# Patient Record
Sex: FEMALE | Race: WHITE
[De-identification: names, ages, dates, MRNs, and addresses within clinical notes are randomized per-mention and may not be internally consistent; named-entity substitution may affect disease eponyms.]

---

## 2018-11-12 ENCOUNTER — HOSPITAL ENCOUNTER (EMERGENCY)
Dept: HOSPITAL 63 - ER | Age: 23
Discharge: HOME | End: 2018-11-12
Payer: OTHER GOVERNMENT

## 2018-11-12 VITALS — DIASTOLIC BLOOD PRESSURE: 68 MMHG | SYSTOLIC BLOOD PRESSURE: 116 MMHG

## 2018-11-12 DIAGNOSIS — R11.11: ICD-10-CM

## 2018-11-12 DIAGNOSIS — R05: ICD-10-CM

## 2018-11-12 DIAGNOSIS — R10.11: Primary | ICD-10-CM

## 2018-11-12 LAB
ALBUMIN SERPL-MCNC: 3.3 G/DL (ref 3.4–5)
ALBUMIN/GLOB SERPL: 0.8 {RATIO} (ref 1–1.7)
ALP SERPL-CCNC: 54 U/L (ref 46–116)
ALT SERPL-CCNC: 16 U/L (ref 14–59)
ANION GAP SERPL CALC-SCNC: 8 MMOL/L (ref 6–14)
AST SERPL-CCNC: 12 U/L (ref 15–37)
BASOPHILS # BLD AUTO: 0.1 X10^3/UL (ref 0–0.2)
BASOPHILS NFR BLD: 1 % (ref 0–3)
BILIRUB SERPL-MCNC: 0.9 MG/DL (ref 0.2–1)
BUN/CREAT SERPL: 16 (ref 6–20)
CA-I SERPL ISE-MCNC: 11 MG/DL (ref 7–20)
CALCIUM SERPL-MCNC: 8.6 MG/DL (ref 8.5–10.1)
CHLORIDE SERPL-SCNC: 102 MMOL/L (ref 98–107)
CO2 SERPL-SCNC: 28 MMOL/L (ref 21–32)
CREAT SERPL-MCNC: 0.7 MG/DL (ref 0.6–1)
EOSINOPHIL NFR BLD: 0.2 X10^3/UL (ref 0–0.7)
EOSINOPHIL NFR BLD: 2 % (ref 0–3)
ERYTHROCYTE [DISTWIDTH] IN BLOOD BY AUTOMATED COUNT: 12.5 % (ref 11.5–14.5)
GFR SERPLBLD BASED ON 1.73 SQ M-ARVRAT: 102 ML/MIN
GLOBULIN SER-MCNC: 3.9 G/DL (ref 2.2–3.8)
GLUCOSE SERPL-MCNC: 106 MG/DL (ref 70–99)
HCT VFR BLD CALC: 37.2 % (ref 36–47)
HGB BLD-MCNC: 12.5 G/DL (ref 12–15.5)
LYMPHOCYTES # BLD: 2.3 X10^3/UL (ref 1–4.8)
LYMPHOCYTES NFR BLD AUTO: 28 % (ref 24–48)
MCH RBC QN AUTO: 29 PG (ref 25–35)
MCHC RBC AUTO-ENTMCNC: 34 G/DL (ref 31–37)
MCV RBC AUTO: 87 FL (ref 79–100)
MONO #: 0.5 X10^3/UL (ref 0–1.1)
MONOCYTES NFR BLD: 7 % (ref 0–9)
NEUT #: 5.1 X10^3UL (ref 1.8–7.7)
NEUTROPHILS NFR BLD AUTO: 62 % (ref 31–73)
PLATELET # BLD AUTO: 354 X10^3/UL (ref 140–400)
POTASSIUM SERPL-SCNC: 3.6 MMOL/L (ref 3.5–5.1)
PROT SERPL-MCNC: 7.2 G/DL (ref 6.4–8.2)
RBC # BLD AUTO: 4.28 X10^6/UL (ref 3.5–5.4)
SODIUM SERPL-SCNC: 138 MMOL/L (ref 136–145)
WBC # BLD AUTO: 8.1 X10^3/UL (ref 4–11)

## 2018-11-12 PROCEDURE — 85025 COMPLETE CBC W/AUTO DIFF WBC: CPT

## 2018-11-12 PROCEDURE — 99284 EMERGENCY DEPT VISIT MOD MDM: CPT

## 2018-11-12 PROCEDURE — 80053 COMPREHEN METABOLIC PANEL: CPT

## 2018-11-12 PROCEDURE — 36415 COLL VENOUS BLD VENIPUNCTURE: CPT

## 2018-11-12 NOTE — PHYS DOC
Adult General


Chief Complaint


Chief Complaint:  ABDOMINAL PAIN





[facility]


HPI


23-year-old female presents with right upper quadrant abdominal pain. The 

patient was diagnosed a few months ago with gallstones. She was unable to 

schedule surgery at that time. She was feeling well until last month when she 

began to have pain after eating almost any food.  She presents today because 

the pain started last night and has not let up. She had one episode of vomiting 

this morning. She is concerned she might have a stone obstructing the common 

bile duct. She denies fever or chills.





Review of Systems


Review of Systems





Constitutional: Denies fever or chills []


Eyes: Denies change in visual acuity, redness, or eye pain []


HENT: Denies nasal congestion or sore throat []


Respiratory: Denies cough or shortness of breath []


Cardiovascular: No additional information not addressed in HPI []


GI: Right upper quadrant abdominal pain, vomiting[]


: Denies dysuria or hematuria []


Musculoskeletal: Denies back pain or joint pain []


Integument: Denies rash or skin lesions []


Neurologic: Denies headache, focal weakness or sensory changes []


Endocrine: Denies polyuria or polydipsia []





All other systems were reviewed and found to be within normal limits, except as 

documented in this note.





Allergies


Allergies





Allergies








Coded Allergies Type Severity Reaction Last Updated Verified


 


  No Known Drug Allergies    11/12/18 No











Physical Exam


Physical Exam





Constitutional: Well developed, well nourished, no acute distress, non-toxic 

appearance. []


HENT: Normocephalic, atraumatic, bilateral external ears normal, oropharynx 

moist, no oral exudates, nose normal. []


Eyes: PERRLA, EOMI, conjunctiva normal, no discharge. [] 


Neck: Normal range of motion, no tenderness, supple, no stridor. [] 


Cardiovascular:Heart rate regular rhythm, no murmur []


Lungs & Thorax:  Bilateral breath sounds clear to auscultation []


Abdomen: Bowel sounds normal, soft. Epigastric and right upper quadrant 

tenderness with guarding.[] 


Skin: Warm, dry, no erythema, no rash. [] 


Back: No tenderness, no CVA tenderness. [] 


Extremities: No tenderness, no cyanosis, no clubbing, ROM intact, no edema. [] 


Neurologic: Alert and oriented X 3, normal motor function, normal sensory 

function, no focal deficits noted. []


Psychologic: Affect normal, judgement normal, mood normal. []





EKG


EKG


[]





Radiology/Procedures


Radiology/Procedures


[]





Course & Med Decision Making


Course & Med Decision Making


Pertinent Labs and Imaging studies reviewed. (See chart for details)


The patient's labs are unremarkable. This pain is likely related to her 

gallbladder disease. She does not appear to have an obstruction or infection at 

this time. I will discharge her with Toradol oral medication to see if this 

helps better than over-the-counter medicines. The patient has also had a cough 

for a couple of weeks. I do not have evidence of strep or bacterial infection. 

This is likely viral induced cough. I will give her a trial of Tessalon Perles. 

She is stable for discharge at this time.


[]





Dragon Disclaimer


Dragon Disclaimer


This electronic medical record was generated, in whole or in part, using a 

voice recognition dictation system.





Departure


Departure:


Referrals:  


DAYANA HAILE DO, MPH (PCP)


Scripts


Benzonatate (TESSALON PERLE) 100 Mg Capsule


1 CAP PO TID PRN for COUGH, #30 CAP


   Prov: JANELL ORELLANA DO         11/12/18 


Ketorolac Tromethamine (KETOROLAC TROMETHAMINE) 10 Mg Tablet


1 TAB PO PRN Q6HRS PRN for PAIN, #20 TAB


   Prov: JANELL ORELLANA DO         11/12/18











JANELL ORELLANA DO Nov 12, 2018 13:43

## 2018-12-03 ENCOUNTER — HOSPITAL ENCOUNTER (EMERGENCY)
Dept: HOSPITAL 63 - ER | Age: 23
Discharge: HOME | End: 2018-12-03
Payer: COMMERCIAL

## 2018-12-03 VITALS — HEIGHT: 63 IN | BODY MASS INDEX: 23.92 KG/M2 | WEIGHT: 135 LBS

## 2018-12-03 VITALS — DIASTOLIC BLOOD PRESSURE: 65 MMHG | SYSTOLIC BLOOD PRESSURE: 115 MMHG

## 2018-12-03 DIAGNOSIS — S06.0X1A: Primary | ICD-10-CM
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
## 2018-12-03 DIAGNOSIS — R11.2: ICD-10-CM
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
## 2018-12-03 DIAGNOSIS — J32.8: ICD-10-CM
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
## 2018-12-03 PROCEDURE — 70450 CT HEAD/BRAIN W/O DYE: CPT

## 2018-12-03 PROCEDURE — 99284 EMERGENCY DEPT VISIT MOD MDM: CPT

## 2018-12-03 NOTE — RAD
CT of the head without contrast, 12/3/2018:

 

HISTORY: Head trauma, visual disturbance

 

The ventricles are within normal limits in size. There is no shift of the 

midline structures. There is no evidence of acute intracranial hemorrhage 

or mass effect.

 

There is mucosal thickening in the maxillary, ethmoid and frontal sinuses 

bilaterally. An air-fluid level is present in the left maxillary sinus.

 

IMPRESSION:

1. No acute intracranial abnormality is detected.

2. Bilateral paranasal sinusitis

 

 

PQRS Compliance Statement:

 

One or more of the following individualized dose reduction techniques were

utilized for this examination:

1. Automated exposure control

2. Adjustment of the mA and/or kV according to patient size

3. Use of iterative reconstruction technique

 

Electronically signed by: Rick Moritz, MD (12/3/2018 3:01 PM) [facility]

## 2018-12-03 NOTE — PHYS DOC
Past History


Past Medical History:  No Pertinent History


Past Surgical History:  No Surgical History


Alcohol Use:  None


Drug Use:  None





Adult General


Chief Complaint


Chief Complaint:  head injury





HPI


HPI





Patient is a 23 year old female who presents with complaining of head injury. 

Patient states she was at work 3 days ago and hit the back of her head wall 

metal against the loss of consciousness. Patient states she had headache that 

improved yesterday but today she had 1 episode of vomiting and complaining of 

confusion and blurred vision with headache she was at work without focal neuro 

deficit, fever and chills, chest pain and shortness of breath. Patient was sent 

to her primary care physician as a work comp injury and sent to ER for CT of 

her head.





Review of Systems


Review of Systems





Constitutional: Denies fever or chills []


Eyes: Denies change in visual acuity, redness, or eye pain []


HENT: Denies nasal congestion or sore throat []


Respiratory: Denies cough or shortness of breath []


Cardiovascular: No additional information not addressed in HPI []


GI: Denies abdominal pain, bloody stools or diarrhea, reports nausea and 

vomiting []


: Denies dysuria or hematuria []


Musculoskeletal: Denies back pain or joint pain []


Integument: Denies rash or skin lesions []


Neurologic: Reports headache, denies focal weakness or sensory changes []


Endocrine: Denies polyuria or polydipsia []





All other systems were reviewed and found to be within normal limits, except as 

documented in this note.





Current Medications


Current Medications





Current Medications








 Medications


  (Trade)  Dose


 Ordered  Sig/Ran  Start Time


 Stop Time Status Last Admin


Dose Admin


 


 Ibuprofen


  (Motrin)  600 mg  1X  ONCE  12/3/18 15:15


 12/3/18 15:16 UNV  





 


 Ondansetron HCl


  (Zofran Odt)  4 mg  1X  ONCE  12/3/18 14:30


 12/3/18 14:31 DC 12/3/18 14:19


4 MG











Allergies


Allergies





Allergies








Coded Allergies Type Severity Reaction Last Updated Verified


 


  No Known Drug Allergies    18 No











Physical Exam


Physical Exam





Constitutional: Well developed, well nourished, mild distress, non-toxic 

appearance. []


HENT: Normocephalic, atraumatic, bilateral external ears normal, oropharynx 

moist, no oral exudates, nose normal. []


Eyes: PERRLA, EOMI, conjunctiva normal, no discharge. [] 


Neck: Normal range of motion, no tenderness, supple, no stridor. [] 


Cardiovascular:Heart rate regular rhythm, no murmur []


Lungs & Thorax:  Bilateral breath sounds clear to auscultation []


Abdomen: Bowel sounds normal, soft, no tenderness, no masses, no pulsatile 

masses. [] 


Skin: Warm, dry, no erythema, no rash. [] 


Back: No tenderness, no CVA tenderness. [] 


Extremities: No tenderness, no cyanosis, no clubbing, ROM intact, no edema. [] 


Neurologic: Alert and oriented X 3, normal motor function, normal sensory 

function, no focal deficits noted. []


Psychologic: Affect normal, judgement normal, mood normal. []





Current Patient Data


Vital Signs





 Vital Signs








  Date Time  Temp Pulse Resp B/P (MAP) Pulse Ox O2 Delivery O2 Flow Rate FiO2


 


12/3/18 13:43 98.7 73 18  99 Room Air  











EKG


EKG


[]





Radiology/Procedures


Radiology/Procedures


[place] [address]


 [phone]


 


 IMAGING REPORT





 Signed





PATIENT: CHRISTIE FLANAGAN ACCOUNT: [id number] MRN#: [id number]


: 1995 LOCATION: ER AGE: 23


SEX: F EXAM DT: 18 ACCESSION#: [id number]


STATUS: REG ER ORD. PHYSICIAN: NABILA DUNLAP MD 


REASON: injury


PROCEDURE: CT HEAD WO CONTRAST





CT of the head without contrast, 12/3/2018:


 


HISTORY: Head trauma, visual disturbance


 


The ventricles are within normal limits in size. There is no shift of the 


midline structures. There is no evidence of acute intracranial hemorrhage 


or mass effect.


 


There is mucosal thickening in the maxillary, ethmoid and frontal sinuses 


bilaterally. An air-fluid level is present in the left maxillary sinus.


 


IMPRESSION:


1. No acute intracranial abnormality is detected.


2. Bilateral paranasal sinusitis


 


 


PQRS Compliance Statement:


 


One or more of the following individualized dose reduction techniques were


utilized for this examination:


1. Automated exposure control


2. Adjustment of the mA and/or kV according to patient size


3. Use of iterative reconstruction technique


 


Electronically signed by: Rick Moritz, MD (12/3/2018 3:01 PM) [facility]














DICTATED AND SIGNED BY:     MORITZ,RICK S MD


DATE:     18 1459





CC: NABILA DUNLAP MD; DAYANA HAILE DO, MPH ~





Course & Med Decision Making


Course & Med Decision Making


Pertinent Imaging studies reviewed. (See chart for details)





Evolution of patient in ER showed 23-year-old female patient with head injury 2 

days ago and complaining of confusion and nausea and vomiting. Patient had 

unremarkable CT head except for paranasal sinusitis and physical exam without 

sinus tenderness and felt better with Zofran and ibuprofen. Plan discharge 

patient home to diagnose of concussion.





Dragon Disclaimer


Dragon Disclaimer


This electronic medical record was generated, in whole or in part, using a 

voice recognition dictation system.





Departure


Departure:


Impression:  


 Primary Impression:  


 Concussion


 Additional Impressions:  


 Nausea and vomiting


 Sinusitis


Disposition:   HOME, SELF-CARE (at 1522)


Condition:  IMPROVED


Referrals:  


DAYANA HAILE DO, MPH (PCP)


Patient Instructions:  Concussion and Brain Injury, Nausea and Vomiting





Additional Instructions:  


Drink plenty of liquids


Follow-up with your primary care physician in 3-5 days


Return to ER if not getting better


Scripts


Ondansetron Hcl (ZOFRAN) 4 Mg Tablet


1 TAB PO Q6HRS for nausea and vomiting, #12 TAB


   Prov: NABILA DUNLAP MD         12/3/18 


Ibuprofen (IBUPROFEN) 600 Mg Tablet


600 MG PO TID for pain, #20 TAB


   Prov: NABILA DUNLAP MD         12/3/18





Problem Qualifiers











NABILA DUNLAP MD Dec 3, 2018 15:24

## 2019-07-08 ENCOUNTER — HOSPITAL ENCOUNTER (EMERGENCY)
Dept: HOSPITAL 63 - ER | Age: 24
Discharge: HOME | End: 2019-07-08
Payer: COMMERCIAL

## 2019-07-08 VITALS — DIASTOLIC BLOOD PRESSURE: 75 MMHG | SYSTOLIC BLOOD PRESSURE: 123 MMHG

## 2019-07-08 DIAGNOSIS — R10.12: Primary | ICD-10-CM

## 2019-07-08 DIAGNOSIS — G43.909: ICD-10-CM

## 2019-07-08 DIAGNOSIS — K92.1: ICD-10-CM

## 2019-07-08 DIAGNOSIS — R10.32: ICD-10-CM

## 2019-07-08 LAB
ALBUMIN SERPL-MCNC: 3.2 G/DL (ref 3.4–5)
ALBUMIN/GLOB SERPL: 1 {RATIO} (ref 1–1.7)
ALP SERPL-CCNC: 45 U/L (ref 46–116)
ALT SERPL-CCNC: 14 U/L (ref 14–59)
ANION GAP SERPL CALC-SCNC: 8 MMOL/L (ref 6–14)
APTT PPP: YELLOW S
AST SERPL-CCNC: < 5 U/L (ref 15–37)
BACTERIA #/AREA URNS HPF: (no result) /HPF
BASOPHILS # BLD AUTO: 0.1 X10^3/UL (ref 0–0.2)
BASOPHILS NFR BLD: 1 % (ref 0–3)
BILIRUB SERPL-MCNC: 0.3 MG/DL (ref 0.2–1)
BILIRUB UR QL STRIP: (no result)
BUN/CREAT SERPL: 13 (ref 6–20)
CA-I SERPL ISE-MCNC: 9 MG/DL (ref 7–20)
CALCIUM SERPL-MCNC: 8.4 MG/DL (ref 8.5–10.1)
CHLORIDE SERPL-SCNC: 105 MMOL/L (ref 98–107)
CO2 SERPL-SCNC: 28 MMOL/L (ref 21–32)
CREAT SERPL-MCNC: 0.7 MG/DL (ref 0.6–1)
EOSINOPHIL NFR BLD: 0.3 X10^3/UL (ref 0–0.7)
EOSINOPHIL NFR BLD: 4 % (ref 0–3)
ERYTHROCYTE [DISTWIDTH] IN BLOOD BY AUTOMATED COUNT: 12.7 % (ref 11.5–14.5)
FECAL OB PT: NEGATIVE
FIBRINOGEN PPP-MCNC: CLEAR MG/DL
GFR SERPLBLD BASED ON 1.73 SQ M-ARVRAT: 101.1 ML/MIN
GLOBULIN SER-MCNC: 3.1 G/DL (ref 2.2–3.8)
GLUCOSE SERPL-MCNC: 87 MG/DL (ref 70–99)
GLUCOSE UR STRIP-MCNC: (no result) MG/DL
HCT VFR BLD CALC: 39.7 % (ref 36–47)
HGB BLD-MCNC: 13.2 G/DL (ref 12–15.5)
LIPASE: 88 U/L (ref 73–393)
LYMPHOCYTES # BLD: 2.9 X10^3/UL (ref 1–4.8)
LYMPHOCYTES NFR BLD AUTO: 39 % (ref 24–48)
MCH RBC QN AUTO: 30 PG (ref 25–35)
MCHC RBC AUTO-ENTMCNC: 33 G/DL (ref 31–37)
MCV RBC AUTO: 90 FL (ref 79–100)
MONO #: 0.5 X10^3/UL (ref 0–1.1)
MONOCYTES NFR BLD: 6 % (ref 0–9)
NEUT #: 3.7 X10^3UL (ref 1.8–7.7)
NEUTROPHILS NFR BLD AUTO: 50 % (ref 31–73)
NITRITE UR QL STRIP: (no result)
PLATELET # BLD AUTO: 305 X10^3/UL (ref 140–400)
POTASSIUM SERPL-SCNC: 3.9 MMOL/L (ref 3.5–5.1)
PROT SERPL-MCNC: 6.3 G/DL (ref 6.4–8.2)
RBC # BLD AUTO: 4.4 X10^6/UL (ref 3.5–5.4)
RBC #/AREA URNS HPF: 0 /HPF (ref 0–2)
SODIUM SERPL-SCNC: 141 MMOL/L (ref 136–145)
SP GR UR STRIP: 1.01
SQUAMOUS #/AREA URNS LPF: (no result) /LPF
UROBILINOGEN UR-MCNC: 0.2 MG/DL
WBC # BLD AUTO: 7.4 X10^3/UL (ref 4–11)
WBC #/AREA URNS HPF: (no result) /HPF (ref 0–4)

## 2019-07-08 PROCEDURE — 96374 THER/PROPH/DIAG INJ IV PUSH: CPT

## 2019-07-08 PROCEDURE — 74177 CT ABD & PELVIS W/CONTRAST: CPT

## 2019-07-08 PROCEDURE — 36415 COLL VENOUS BLD VENIPUNCTURE: CPT

## 2019-07-08 PROCEDURE — 85025 COMPLETE CBC W/AUTO DIFF WBC: CPT

## 2019-07-08 PROCEDURE — 99285 EMERGENCY DEPT VISIT HI MDM: CPT

## 2019-07-08 PROCEDURE — 81025 URINE PREGNANCY TEST: CPT

## 2019-07-08 PROCEDURE — 83690 ASSAY OF LIPASE: CPT

## 2019-07-08 PROCEDURE — 80053 COMPREHEN METABOLIC PANEL: CPT

## 2019-07-08 PROCEDURE — 81001 URINALYSIS AUTO W/SCOPE: CPT

## 2019-07-08 PROCEDURE — 82274 ASSAY TEST FOR BLOOD FECAL: CPT

## 2019-07-08 PROCEDURE — 87086 URINE CULTURE/COLONY COUNT: CPT

## 2019-07-08 PROCEDURE — 96375 TX/PRO/DX INJ NEW DRUG ADDON: CPT

## 2019-07-08 NOTE — PHYS DOC
Past History


Past Medical History:  Migraines, Other


Past Surgical History:  No Surgical History


Alcohol Use:  None


Drug Use:  None





Adult General


Chief Complaint


Chief Complaint:  ABDOMINAL PAIN





HPI


HPI





Patient is a 24 year old female who presents with complaint of left-sided 

abdominal pain.  Patient states that her symptoms started 2 days ago.  Notes the

pain has been constant along the left side of her abdomen and towards her 

pelvis.  Notes that she has also been passing dark stools and what appears to be

dark red blood.  States that she is on ibuprofen for treatment of migraines.  Patricia dillon was told by her primary doctor to stop taking her ibuprofen and come to the 

emergency department for evaluation due to concern for GI bleeding.  Has not had

any fevers.  Denies any shortness of breath or chest pain currently.





Review of Systems


Review of Systems





Constitutional: Denies fever or chills []


Eyes: Denies change in visual acuity, redness, or eye pain []


HENT: Denies nasal congestion or sore throat []


Respiratory: Denies cough or shortness of breath []


Cardiovascular: Denies chest pain or edema[]


GI: Abdominal pain, bloody stool, denies vomiting[]


: Denies dysuria or hematuria []


Musculoskeletal: Denies back pain or joint pain []


Integument: Denies rash or skin lesions []


Neurologic: Denies headache, focal weakness or sensory changes []








All other systems were reviewed and found to be within normal limits, except as 

documented in this note.





Allergies


Allergies





Allergies








Coded Allergies Type Severity Reaction Last Updated Verified


 


  No Known Drug Allergies    18 No











Physical Exam


Physical Exam





Constitutional: Alert, afebrile, appears in moderate discomfort. []


HENT: Normocephalic, atraumatic, bilateral external ears normal, oropharynx 

moist, no oral exudates, nose normal. []


Eyes: PERRLA, EOMI, conjunctiva normal, no discharge. [] 


Neck: Normal range of motion, no tenderness, supple, no stridor. [] 


Cardiovascular:Heart rate regular rhythm, no murmur []


Lungs & Thorax:  Bilateral breath sounds clear to auscultation []


Abdomen: Bowel sounds normal, soft, left upper and lower quadrant tenderness to 

palpation, no masses, no pulsatile masses. 


Rectal: No external hemorrhoids, nontender on exam, hard dark brown stool 

palpated, no gross blood[] 


Skin: Warm, dry, no erythema, no rash. [] 


Back: No tenderness, no CVA tenderness. [] 


Extremities: No tenderness, no cyanosis, no clubbing, ROM intact, no edema. [] 


Neurologic: Alert and oriented X 3, normal motor function, normal sensory 

function, no focal deficits noted. []





Current Patient Data


Vital Signs





                                   Vital Signs








  Date Time  Temp Pulse Resp B/P (MAP) Pulse Ox O2 Delivery O2 Flow Rate FiO2


 


19 13:42 98.3 82 18  100 Room Air  








Lab Results





Laboratory Tests








Test


 19


13:50 19


14:30 19


14:47


 


White Blood Count 7.4 x10^3/uL   


 


Red Blood Count 4.40 x10^6/uL   


 


Hemoglobin 13.2 g/dL   


 


Hematocrit 39.7 %   


 


Mean Corpuscular Volume 90 fL   


 


Mean Corpuscular Hemoglobin 30 pg   


 


Mean Corpuscular Hemoglobin


Concent 33 g/dL 


 


 





 


Red Cell Distribution Width 12.7 %   


 


Platelet Count 305 x10^3/uL   


 


Neutrophils (%) (Auto) 50 %   


 


Lymphocytes (%) (Auto) 39 %   


 


Monocytes (%) (Auto) 6 %   


 


Eosinophils (%) (Auto) 4 %   


 


Basophils (%) (Auto) 1 %   


 


Neutrophils # (Auto) 3.7 x10^3uL   


 


Lymphocytes # (Auto) 2.9 x10^3/uL   


 


Monocytes # (Auto) 0.5 x10^3/uL   


 


Eosinophils # (Auto) 0.3 x10^3/uL   


 


Basophils # (Auto) 0.1 x10^3/uL   


 


Sodium Level 141 mmol/L   


 


Potassium Level 3.9 mmol/L   


 


Chloride Level 105 mmol/L   


 


Carbon Dioxide Level 28 mmol/L   


 


Anion Gap 8   


 


Blood Urea Nitrogen 9 mg/dL   


 


Creatinine 0.7 mg/dL   


 


Estimated GFR


(Cockcroft-Gault) 101.1 


 


 





 


BUN/Creatinine Ratio 13   


 


Glucose Level 87 mg/dL   


 


Calcium Level 8.4 mg/dL   


 


Total Bilirubin 0.3 mg/dL   


 


Aspartate Amino Transf


(AST/SGOT) < 5 U/L 


 


 





 


Alanine Aminotransferase


(ALT/SGPT) 14 U/L 


 


 





 


Alkaline Phosphatase 45 U/L   


 


Total Protein 6.3 g/dL   


 


Albumin 3.2 g/dL   


 


Albumin/Globulin Ratio 1.0   


 


Lipase 88 U/L   


 


Urine Collection Type  Unknown  


 


Urine Color  Yellow  


 


Urine Clarity  Clear  


 


Urine pH  7.5  


 


Urine Specific Gravity  1.015  


 


Urine Protein  Neg  


 


Urine Glucose (UA)  Neg mg/dL  


 


Urine Ketones (Stick)  Neg mg/dL  


 


Urine Blood  Neg  


 


Urine Nitrite  Neg  


 


Urine Bilirubin  Neg  


 


Urine Urobilinogen Dipstick  0.2 mg/dL  


 


Urine Leukocyte Esterase  Trace  


 


Urine RBC  0 /HPF  


 


Urine WBC  Occ /HPF  


 


Urine Squamous Epithelial


Cells 


 Occ /LPF 


 





 


Urine Bacteria  Few /HPF  


 


Stool Occult Blood  Negative  


 


Bedside Urine HCG, Qualitative   hcg negative 








Current Medications








 Medications


  (Trade)  Dose


 Ordered  Sig/Ran


 Route


 PRN Reason  Start Time


 Stop Time Status Last Admin


Dose Admin


 


 Fentanyl Citrate


  (Fentanyl 2ml


 Vial)  50 mcg  PRN Q15MIN  PRN


 IV


 PAIN GREATER THAN 3/10  19 14:15


 19 14:14   





 


 Sodium Chloride  1,000 ml @ 


 1,000 mls/hr  Q1H


 IV


   19 13:44


 19 14:43 DC 19 13:56





 


 Ondansetron HCl


  (Zofran)  4 mg  1X  ONCE


 IV


   19 14:15


 19 14:16 DC 19 13:56





 


 Famotidine


  (Pepcid Vial)  20 mg  1X  ONCE


 IVP


   19 14:15


 19 14:16 DC 19 13:56





 


 Iohexol


  (Omnipaque 300


 Mg/ml)  75 ml  1X  ONCE


 IV


   19 14:15


 19 14:16 DC 19 14:58














EKG


EKG


Not performed[]





Radiology/Procedures


Radiology/Procedures


[facility] [address], [place] [address] [phone]


                                        


                                 IMAGING REPORT





                                     Signed





PATIENT: CHRISTIE FLANAGAN   ACCOUNT: [id number]     MRN#: [id number]


: 1995           LOCATION: ER              AGE: 24


SEX: F                    EXAM DT: 19         ACCESSION#: [id number]


STATUS: REG ER            ORD. PHYSICIAN: CHEL DIALLO MD   


REASON: left sided abdominal pain, blood in stool


PROCEDURE: CT ABD PELV W/ IV CONTRST ONLY





CT ABD PELV W/ IV CONTRST ONLY 


 


Indication: Left-sided abdominal pain, blood in stool


 


Technique: Postcontrast CT imaging was performed of the abdomen pelvis, 


multiplanar reconstruction images submitted. No oral contrast was given. 


One or more of the following individualized dose reduction techniques were


utilized for this examination:  1. Automated exposure control  2. 


Adjustment of the mA and/or kV according to patient size  3. Use of 


iterative reconstruction technique.


 


Comparison: None


 


Findings:  


There is no abnormality of the limited visualized lung bases. No focal 


abnormality is identified of the liver, spleen, pancreas. Gallbladder is 


present without obvious intraluminal abnormality by CT. Both kidneys 


enhance no hydronephrosis. There is small 0.2 cm likely mid left renal 


calculus. There is mild distention of the urinary bladder. Accurate 


evaluation of bowel somewhat limited without oral contrast. Bowel is not 


considered significantly dilated. There is relative wall prominence of 


segments of the small bowel in the left abdomen. There is some variable 


retained stool the colon. At least a segment of normal appendix is 


believed to be visualized, no significant pericecal inflammatory type 


change. There may be some small cysts or follicles of the left adnexa. 


Uterus is deviated to the right. 


 


IMPRESSION:


1.  There is probable degree of small bowel thickening in the left abdomen


as may be seen with enteritis, limited evaluation of bowel without oral 


contrast. There is variable retained stool in the colon. At least a 


segment of normal appendix is believed to be visualized.


2. There is suspected small nonobstructive left renal calculus.


 


Electronically signed by: Pranav Oliva MD (2019 3:17 PM) [facility]














DICTATED AND SIGNED BY:     PRANAV OLIVA MD


DATE:     19 1517





CC: CHEL DIALLO MD; DAYANA HAILE DO, MPH ~


[]





Course & Med Decision Making


Course & Med Decision Making


Pertinent Labs and Imaging studies reviewed. (See chart for details)





Patient was given IV fluids, fentanyl, and Zofran.  CT imaging shows no acute 

surgical process causing patient's current symptoms.  Mild small bowel thicke

noe was noted that could signify enteritis.  Patient does not have fever or 

leukocytosis.  Moderate amount of stool appears throughout the colon.  Symptoms 

may be affected by constipation.  Patient prescribed Zofran and Bentyl for 

outpatient treatment.  Also advised to take daily MiraLAX.  Recommended follow-

up with primary doctor in 2 days.  No evidence of acute bleeding based off of 

today's examination.  Advised return to emergency department for any worsening 

symptoms.  Patient was understanding and in agreement with treatment plan.[]





Dragon Disclaimer


Dragon Disclaimer


This electronic medical record was generated, in whole or in part, using a voice

 recognition dictation system.





Departure


Departure:


Impression:  


   Primary Impression:  


   Abdominal pain


Disposition:   HOME, SELF-CARE


Condition:  IMPROVED


Referrals:  


DAYANA HAILE DO, MPH (PCP)


Patient Instructions:  Abdominal Pain (Nonspecific)





Additional Instructions:  


Your examination and testing thankfully showed no evidence of acute bleeding at 

today's visit.  Your symptoms may be affected by constipation.  His recommended 

that you start treatment with MiraLAX available over-the-counter once daily.  

Follow-up with your primary doctor in 2 days for reevaluation.  Return to the 

emergency department for any worsening symptoms.


Scripts


Ondansetron (ONDANSETRON ODT) 4 Mg Tab.rapdis


1 TAB PO PRN Q6-8HRS PRN for NAUSEA/VOMITING, #16 TAB


   Prov: CHEL DIALLO MD         19 


Dicyclomine Hcl (DICYCLOMINE HCL) 20 Mg Tablet


1 TAB PO TID, #30 TAB 0 Refills


   Prov: CHEL DIALLO MD         19





Problem Qualifiers








   Primary Impression:  


   Abdominal pain


   Abdominal location:  left upper quadrant  Qualified Codes:  R10.12 - Left 

   upper quadrant pain








CHEL DIALLO MD                2019 13:48

## 2019-07-08 NOTE — RAD
CT ABD PELV W/ IV CONTRST ONLY 

 

Indication: Left-sided abdominal pain, blood in stool

 

Technique: Postcontrast CT imaging was performed of the abdomen pelvis, 

multiplanar reconstruction images submitted. No oral contrast was given. 

One or more of the following individualized dose reduction techniques were

utilized for this examination:  1. Automated exposure control  2. 

Adjustment of the mA and/or kV according to patient size  3. Use of 

iterative reconstruction technique.

 

Comparison: None

 

Findings:  

There is no abnormality of the limited visualized lung bases. No focal 

abnormality is identified of the liver, spleen, pancreas. Gallbladder is 

present without obvious intraluminal abnormality by CT. Both kidneys 

enhance no hydronephrosis. There is small 0.2 cm likely mid left renal 

calculus. There is mild distention of the urinary bladder. Accurate 

evaluation of bowel somewhat limited without oral contrast. Bowel is not 

considered significantly dilated. There is relative wall prominence of 

segments of the small bowel in the left abdomen. There is some variable 

retained stool the colon. At least a segment of normal appendix is 

believed to be visualized, no significant pericecal inflammatory type 

change. There may be some small cysts or follicles of the left adnexa. 

Uterus is deviated to the right. 

 

IMPRESSION:

1.  There is probable degree of small bowel thickening in the left abdomen

as may be seen with enteritis, limited evaluation of bowel without oral 

contrast. There is variable retained stool in the colon. At least a 

segment of normal appendix is believed to be visualized.

2. There is suspected small nonobstructive left renal calculus.

 

Electronically signed by: Davi Hoyos MD (7/8/2019 3:17 PM) [facility]

## 2020-07-10 ENCOUNTER — HOSPITAL ENCOUNTER (EMERGENCY)
Dept: HOSPITAL 63 - ER | Age: 25
Discharge: HOME | End: 2020-07-10
Payer: OTHER GOVERNMENT

## 2020-07-10 VITALS — DIASTOLIC BLOOD PRESSURE: 77 MMHG | SYSTOLIC BLOOD PRESSURE: 134 MMHG

## 2020-07-10 VITALS — WEIGHT: 177.91 LBS | BODY MASS INDEX: 31.52 KG/M2 | HEIGHT: 63 IN

## 2020-07-10 DIAGNOSIS — I49.3: Primary | ICD-10-CM

## 2020-07-10 DIAGNOSIS — G43.909: ICD-10-CM

## 2020-07-10 DIAGNOSIS — F32.9: ICD-10-CM

## 2020-07-10 LAB
ANION GAP SERPL CALC-SCNC: 9 MMOL/L (ref 6–14)
CA-I SERPL ISE-MCNC: 11 MG/DL (ref 7–20)
CALCIUM SERPL-MCNC: 8.7 MG/DL (ref 8.5–10.1)
CHLORIDE SERPL-SCNC: 102 MMOL/L (ref 98–107)
CO2 SERPL-SCNC: 26 MMOL/L (ref 21–32)
CREAT SERPL-MCNC: 0.9 MG/DL (ref 0.6–1)
GFR SERPLBLD BASED ON 1.73 SQ M-ARVRAT: 75.1 ML/MIN
GLUCOSE SERPL-MCNC: 104 MG/DL (ref 70–99)
POTASSIUM SERPL-SCNC: 3.7 MMOL/L (ref 3.5–5.1)
SODIUM SERPL-SCNC: 137 MMOL/L (ref 136–145)

## 2020-07-10 PROCEDURE — 83735 ASSAY OF MAGNESIUM: CPT

## 2020-07-10 PROCEDURE — 36415 COLL VENOUS BLD VENIPUNCTURE: CPT

## 2020-07-10 PROCEDURE — 93005 ELECTROCARDIOGRAM TRACING: CPT

## 2020-07-10 PROCEDURE — 81025 URINE PREGNANCY TEST: CPT

## 2020-07-10 PROCEDURE — 80048 BASIC METABOLIC PNL TOTAL CA: CPT

## 2020-07-10 PROCEDURE — 99284 EMERGENCY DEPT VISIT MOD MDM: CPT

## 2020-07-10 NOTE — PHYS DOC
Past History


Past Medical History:  Depression, Migraines, Other


Additional Past Medical Histor:  TBI 2019


Past Surgical History:  Other


Additional Past Surgical Histo:  abdominal hernia repair, multiple D&Cs


Alcohol Use:  None


Drug Use:  None





Adult General


Chief Complaint


Chief Complaint:  Palpitations





HPI


HPI





Patient is a [age] year old [sex] who presents with []





Review of Systems


Review of Systems





Constitutional: Denies fever or chills []


Eyes: Denies change in visual acuity, redness, or eye pain []


HENT: Denies nasal congestion or sore throat []


Respiratory: Denies cough or shortness of breath []


Cardiovascular: No additional information not addressed in HPI []


GI: Denies abdominal pain, nausea, vomiting, bloody stools or diarrhea []


: Denies dysuria or hematuria []


Musculoskeletal: Denies back pain or joint pain []


Integument: Denies rash or skin lesions []


Neurologic: Denies headache, focal weakness or sensory changes []


Endocrine: Denies polyuria or polydipsia []





All other systems were reviewed and found to be within normal limits, except as 

documented in this note.





Allergies


Allergies





Allergies








Coded Allergies Type Severity Reaction Last Updated Verified


 


  No Known Drug Allergies    11/12/18 No











Physical Exam


Physical Exam





Constitutional: Well developed, well nourished, no acute distress, non-toxic 

appearance. []


HENT: Normocephalic, atraumatic, bilateral external ears normal, oropharynx mois

t, no oral exudates, nose normal. []


Eyes: PERRLA, EOMI, conjunctiva normal, no discharge. [] 


Neck: Normal range of motion, no tenderness, supple, no stridor. [] 


Cardiovascular:Heart rate regular rhythm, no murmur []


Lungs & Thorax:  Bilateral breath sounds clear to auscultation []


Abdomen: Bowel sounds normal, soft, no tenderness, no masses, no pulsatile 

masses. [] 


Skin: Warm, dry, no erythema, no rash. [] 


Back: No tenderness, no CVA tenderness. [] 


Extremities: No tenderness, no cyanosis, no clubbing, ROM intact, no edema. [] 


Neurologic: Alert and oriented X 3, normal motor function, normal sensory 

function, no focal deficits noted. []


Psychologic: Affect normal, judgement normal, mood normal. []





Current Patient Data


Vital Signs





                                   Vital Signs








  Date Time  Temp Pulse Resp B/P (MAP) Pulse Ox O2 Delivery O2 Flow Rate FiO2


 


7/10/20 15:17 99.2 84 16 134/77 (96) 98 Room Air  








Lab Results





                                Laboratory Tests








Test


 7/10/20


15:30 7/10/20


15:40


 


Sodium Level


 137 mmol/L


(136-145) 





 


Potassium Level


 3.7 mmol/L


(3.5-5.1) 





 


Chloride Level


 102 mmol/L


() 





 


Carbon Dioxide Level


 26 mmol/L


(21-32) 





 


Anion Gap 9 (6-14)   


 


Blood Urea Nitrogen


 11 mg/dL


(7-20) 





 


Creatinine


 0.9 mg/dL


(0.6-1.0) 





 


Estimated GFR


(Cockcroft-Gault) 75.1  


 





 


Glucose Level


 104 mg/dL


(70-99)  H 





 


Calcium Level


 8.7 mg/dL


(8.5-10.1) 





 


Magnesium Level


 1.8 mg/dL


(1.8-2.4) 





 


POC Urine HCG, Qualitative


 


 hcg negative


(Negative)











EKG


EKG


[]





Radiology/Procedures


Radiology/Procedures


[]





Course & Med Decision Making


Course & Med Decision Making


Pertinent Labs and Imaging studies reviewed. (See chart for details)





[]





Dragon Disclaimer


Dragon Disclaimer


This electronic medical record was generated, in whole or in part, using a voice

 recognition dictation system.





Departure


Departure:


Impression:  


   Primary Impression:  


   PVC (premature ventricular contraction)


Disposition:  01 HOME/RESIDENCE PRIOR TO ADM


Condition:  STABLE


Referrals:  


PCP,UNKNOWN (PCP)


Patient Instructions:  Premature Ventricular Contraction





Additional Instructions:  


If they do not resolve by Monday, please call your primary care physician to be 

placed on a cardiac holter monitor.





Justification of Admission:


Justification of Admission:


Justification of Admission Dx:  AMY Mclaughlin MD              Jul 10, 2020 17:33

## 2020-07-10 NOTE — EKG
[facility] [address], [place] [address]

Test Date:    2020-07-10               Test Time:    15:11:20

Pat Name:     CHRISTIE FLANAGAN          Department:   

Patient ID:   [id number]           Room:          

Gender:       F                        Technician:   

:          [id number]               Requested By: AMY CERNA

Order Number: [id number]            Reading MD:     

                                 Measurements

Intervals                              Axis          

Rate:         78                       P:            29

AR:           162                      QRS:          32

QRSD:         74                       T:            -4

QT:           376                                    

QTc:          432                                    

                           Interpretive Statements

SINUS RHYTHM

NORMAL ECG

RI6.02

No previous ECG available for comparison